# Patient Record
Sex: MALE | Race: WHITE
[De-identification: names, ages, dates, MRNs, and addresses within clinical notes are randomized per-mention and may not be internally consistent; named-entity substitution may affect disease eponyms.]

---

## 2019-01-13 ENCOUNTER — HOSPITAL ENCOUNTER (EMERGENCY)
Dept: HOSPITAL 72 - EMR | Age: 34
Discharge: HOME | End: 2019-01-13
Payer: COMMERCIAL

## 2019-01-13 VITALS — DIASTOLIC BLOOD PRESSURE: 61 MMHG | SYSTOLIC BLOOD PRESSURE: 104 MMHG

## 2019-01-13 VITALS — BODY MASS INDEX: 28.44 KG/M2 | HEIGHT: 69 IN | WEIGHT: 192 LBS

## 2019-01-13 VITALS — SYSTOLIC BLOOD PRESSURE: 107 MMHG | DIASTOLIC BLOOD PRESSURE: 69 MMHG

## 2019-01-13 DIAGNOSIS — G62.9: Primary | ICD-10-CM

## 2019-01-13 DIAGNOSIS — I10: ICD-10-CM

## 2019-01-13 DIAGNOSIS — J45.909: ICD-10-CM

## 2019-01-13 LAB
ADD MANUAL DIFF: NO
ALBUMIN SERPL-MCNC: 3.6 G/DL (ref 3.4–5)
ALBUMIN/GLOB SERPL: 0.9 {RATIO} (ref 1–2.7)
ALP SERPL-CCNC: 87 U/L (ref 46–116)
ALT SERPL-CCNC: 97 U/L (ref 12–78)
ANION GAP SERPL CALC-SCNC: 3 MMOL/L (ref 5–15)
AST SERPL-CCNC: 49 U/L (ref 15–37)
BASOPHILS NFR BLD AUTO: 1.6 % (ref 0–2)
BILIRUB SERPL-MCNC: 0.4 MG/DL (ref 0.2–1)
BUN SERPL-MCNC: 14 MG/DL (ref 7–18)
CALCIUM SERPL-MCNC: 9.2 MG/DL (ref 8.5–10.1)
CHLORIDE SERPL-SCNC: 101 MMOL/L (ref 98–107)
CO2 SERPL-SCNC: 33 MMOL/L (ref 21–32)
CREAT SERPL-MCNC: 1.1 MG/DL (ref 0.55–1.3)
EOSINOPHIL NFR BLD AUTO: 4.7 % (ref 0–3)
ERYTHROCYTE [DISTWIDTH] IN BLOOD BY AUTOMATED COUNT: 11.3 % (ref 11.6–14.8)
GLOBULIN SER-MCNC: 4 G/DL
HCT VFR BLD CALC: 44.7 % (ref 42–52)
HGB BLD-MCNC: 15.2 G/DL (ref 14.2–18)
LYMPHOCYTES NFR BLD AUTO: 35.9 % (ref 20–45)
MCV RBC AUTO: 91 FL (ref 80–99)
MONOCYTES NFR BLD AUTO: 10.3 % (ref 1–10)
NEUTROPHILS NFR BLD AUTO: 47.5 % (ref 45–75)
PLATELET # BLD: 204 K/UL (ref 150–450)
POTASSIUM SERPL-SCNC: 4.4 MMOL/L (ref 3.5–5.1)
RBC # BLD AUTO: 4.91 M/UL (ref 4.7–6.1)
SODIUM SERPL-SCNC: 137 MMOL/L (ref 136–145)
WBC # BLD AUTO: 7.8 K/UL (ref 4.8–10.8)

## 2019-01-13 PROCEDURE — 93005 ELECTROCARDIOGRAM TRACING: CPT

## 2019-01-13 PROCEDURE — 85025 COMPLETE CBC W/AUTO DIFF WBC: CPT

## 2019-01-13 PROCEDURE — 84484 ASSAY OF TROPONIN QUANT: CPT

## 2019-01-13 PROCEDURE — 99284 EMERGENCY DEPT VISIT MOD MDM: CPT

## 2019-01-13 PROCEDURE — 80053 COMPREHEN METABOLIC PANEL: CPT

## 2019-01-13 PROCEDURE — 36415 COLL VENOUS BLD VENIPUNCTURE: CPT

## 2019-01-13 PROCEDURE — 70450 CT HEAD/BRAIN W/O DYE: CPT

## 2019-01-13 NOTE — EMERGENCY ROOM REPORT
History of Present Illness


General


Chief Complaint:  Pain


Source:  Patient





Present Illness


HPI


Patient is a 33-year-old male presented after increased left lower extremity 

numbness.  This is been present for approximately 3 days.  Patient reports 

having prior history of congestive heart failure.  He reports the has heart 

failure secondary to alcohol and drug abuse.  Patient states his last echo he 

had an ejection fraction of approximate 15%.  Patient reports being actively 

drinking at the time of that echocardiogram.  He reports having increased 

numbness to his left lower extremity.  He denies any increase in swelling.  He 

reports taking multiple medications for his heart.


Allergies:  


Coded Allergies:  


     No Known Allergies (Unverified , 1/13/19)





Patient History


Past Medical History:  see triage record


Reviewed Nursing Documentation:  PMH: Agreed; PSxH: Agreed





Nursing Documentation-PMH


Past Medical History:  No Stated History


Hx Hypertension:  Yes


Hx Asthma:  Yes





Review of Systems


All Other Systems:  negative except mentioned in HPI





Physical Exam





Vital Signs








  Date Time  Temp Pulse Resp B/P (MAP) Pulse Ox O2 Delivery O2 Flow Rate FiO2


 


1/13/19 18:20 97.9 74 18 107/69 98 Room Air  








Sp02 EP Interpretation:  reviewed, normal


General Appearance:  normal inspection, well appearing, no apparent distress, 

alert, GCS 15


Head:  atraumatic


ENT:  normal ENT inspection, hearing grossly normal, normal voice


Neck:  normal inspection, full range of motion, supple, no bony tend


Respiratory:  normal inspection, lungs clear, normal breath sounds, no 

respiratory distress, no retraction, no wheezing


Cardiovascular #1:  regular rate, rhythm, no edema


Gastrointestinal:  normal inspection, normal bowel sounds, non tender, soft, no 

guarding, no hernia


Genitourinary:  no CVA tenderness


Musculoskeletal:  normal inspection, back normal, normal range of motion


Neurologic:  normal inspection, alert, oriented x3, responsive, CNs III-XII nml 

as tested, motor strength/tone normal, DTRs symmetric, speech normal, sensory 

deficit, oriented


Psychiatric:  normal inspection, judgement/insight normal, mood/affect normal


Skin:  normal inspection, normal color, no rash





Medical Decision Making


Diagnostic Impression:  


 Primary Impression:  


 Neuropathy


ER Course


 Patient presented for left lower extremity pain.  Differential diagnosis 

included but was not limited to fracture, contusion, vascular insufficiency, 

aortic aneurysm, cellulitis.CT of the head read by radiology showed no acute 

intracranial hemorrhage or definite CVA.  Patient was noted to have some what 

appeared to be artifact per radiology reading.  Laboratory testing was 

unremarkable except for some slight elevation of his liver function test 

consistent with his prior history of hepatitis.  Patient was noted to have 

normal oxygen saturation.  He was noted to have multiple patches of areas of 

pain.  He was advised to follow-up with neurology and advised that he may need 

MRI and to return if he began having worsening symptoms weakness fever or other 

concerns.





Labs








Test


  1/13/19


19:00


 


White Blood Count


  7.8 K/UL


(4.8-10.8)


 


Red Blood Count


  4.91 M/UL


(4.70-6.10)


 


Hemoglobin


  15.2 G/DL


(14.2-18.0)


 


Hematocrit


  44.7 %


(42.0-52.0)


 


Mean Corpuscular Volume 91 FL (80-99) 


 


Mean Corpuscular Hemoglobin


  31.0 PG


(27.0-31.0)


 


Mean Corpuscular Hemoglobin


Concent 34.0 G/DL


(32.0-36.0)


 


Red Cell Distribution Width


  11.3 %


(11.6-14.8)


 


Platelet Count


  204 K/UL


(150-450)


 


Mean Platelet Volume


  7.7 FL


(6.5-10.1)


 


Neutrophils (%) (Auto)


  47.5 %


(45.0-75.0)


 


Lymphocytes (%) (Auto)


  35.9 %


(20.0-45.0)


 


Monocytes (%) (Auto)


  10.3 %


(1.0-10.0)


 


Eosinophils (%) (Auto)


  4.7 %


(0.0-3.0)


 


Basophils (%) (Auto)


  1.6 %


(0.0-2.0)


 


Sodium Level


  137 MMOL/L


(136-145)


 


Potassium Level


  4.4 MMOL/L


(3.5-5.1)


 


Chloride Level


  101 MMOL/L


()


 


Carbon Dioxide Level


  33 MMOL/L


(21-32)


 


Anion Gap


  3 mmol/L


(5-15)


 


Blood Urea Nitrogen


  14 mg/dL


(7-18)


 


Creatinine


  1.1 MG/DL


(0.55-1.30)


 


Estimat Glomerular Filtration


Rate > 60 mL/min


(>60)


 


Glucose Level


  92 MG/DL


()


 


Calcium Level


  9.2 MG/DL


(8.5-10.1)


 


Total Bilirubin


  0.4 MG/DL


(0.2-1.0)


 


Aspartate Amino Transf


(AST/SGOT) 49 U/L (15-37) 


 


 


Alanine Aminotransferase


(ALT/SGPT) 97 U/L (12-78) 


 


 


Alkaline Phosphatase


  87 U/L


()


 


Troponin I


  0.000 ng/mL


(0.000-0.056)


 


Total Protein


  7.6 G/DL


(6.4-8.2)


 


Albumin


  3.6 G/DL


(3.4-5.0)


 


Globulin 4.0 g/dL 


 


Albumin/Globulin Ratio 0.9 (1.0-2.7) 








EKG Diagnostic Results


Rate:  normal


Rhythm:  NSR


ST Segments:  no acute changes





Last Vital Signs








  Date Time  Temp Pulse Resp B/P (MAP) Pulse Ox O2 Delivery O2 Flow Rate FiO2


 


1/13/19 18:20 97.9 74 18 107/69 98 Room Air  








Status:  unchanged


Disposition:  HOME, SELF-CARE











Dar Zaman MD Jan 13, 2019 18:35

## 2019-01-14 NOTE — DIAGNOSTIC IMAGING REPORT
Indication: Headache

 

Technique: Contiguous 5 mm thick transaxial imaging of the head obtained in a Siemens

Sensation 64 slice CT scanner.  Soft tissue and bone windows generated.  Automatic

Exposure Control was utilized.

 

 

Total Dose length Product (DLP):  1382.66 mGycm

 

CT Dose Index Volume (CTDIvol):   70.38 mGy

 

Comparison: none

 

Findings: The size and configuration of the cortical sulci, basal cisterns, and

ventricles are within normal limits for age. There is no mass effect, midline shift,

or edema identified. There is no evidence of acute hemorrhage or abnormal intra-axial

or extra-axial fluid collections. The bones and soft tissues are unremarkable.

 

Impression: No mass effect, edema or acute bleed.

 

[facility] has communicated the preliminary results to the Emergency

Department.  Their findings are largely concordant with this report.

 

 

 

The CT scanner at [facility] is accredited by the American College of

Radiology and the scans are performed using dose optimization techniques as

appropriate to a performed exam including Automatic Exposure control.

## 2019-01-22 ENCOUNTER — HOSPITAL ENCOUNTER (EMERGENCY)
Dept: HOSPITAL 72 - EMR | Age: 34
Discharge: HOME | End: 2019-01-22
Payer: COMMERCIAL

## 2019-01-22 VITALS — SYSTOLIC BLOOD PRESSURE: 110 MMHG | DIASTOLIC BLOOD PRESSURE: 78 MMHG

## 2019-01-22 VITALS — WEIGHT: 200 LBS | BODY MASS INDEX: 29.62 KG/M2 | HEIGHT: 69 IN

## 2019-01-22 DIAGNOSIS — R11.2: Primary | ICD-10-CM

## 2019-01-22 DIAGNOSIS — R19.7: ICD-10-CM

## 2019-01-22 DIAGNOSIS — F11.10: ICD-10-CM

## 2019-01-22 PROCEDURE — 99282 EMERGENCY DEPT VISIT SF MDM: CPT

## 2019-01-22 NOTE — NUR
ED Nurse Note:



Pt came in w/ complaints of abdominal pain, nausea, vomiting, diarrhea since 
this morning s/p naltrexone implant to the left hip. A + O x4. Ambulatory. Skin 
warm to touch. Incision site clean and intact. Open to air. Complaiing of 10/10 
abdominal pain.

## 2019-01-27 ENCOUNTER — HOSPITAL ENCOUNTER (EMERGENCY)
Dept: HOSPITAL 72 - EMR | Age: 34
Discharge: HOME | End: 2019-01-27
Payer: MEDICAID

## 2019-01-27 VITALS — SYSTOLIC BLOOD PRESSURE: 112 MMHG | DIASTOLIC BLOOD PRESSURE: 80 MMHG

## 2019-01-27 VITALS — DIASTOLIC BLOOD PRESSURE: 76 MMHG | SYSTOLIC BLOOD PRESSURE: 110 MMHG

## 2019-01-27 VITALS — BODY MASS INDEX: 26.66 KG/M2 | WEIGHT: 180 LBS | HEIGHT: 69 IN

## 2019-01-27 DIAGNOSIS — Z02.83: Primary | ICD-10-CM

## 2019-01-27 DIAGNOSIS — I50.9: ICD-10-CM

## 2019-01-27 DIAGNOSIS — I11.0: ICD-10-CM

## 2019-01-27 LAB
APPEARANCE UR: CLEAR
APTT PPP: (no result) S
GLUCOSE UR STRIP-MCNC: NEGATIVE MG/DL
KETONES UR QL STRIP: NEGATIVE
LEUKOCYTE ESTERASE UR QL STRIP: (no result)
NITRITE UR QL STRIP: NEGATIVE
PH UR STRIP: 5 [PH] (ref 4.5–8)
PROT UR QL STRIP: NEGATIVE
SP GR UR STRIP: 1.01 (ref 1–1.03)
UROBILINOGEN UR-MCNC: NORMAL MG/DL (ref 0–1)

## 2019-01-27 PROCEDURE — 81001 URINALYSIS AUTO W/SCOPE: CPT

## 2019-01-27 PROCEDURE — 99283 EMERGENCY DEPT VISIT LOW MDM: CPT

## 2019-01-27 PROCEDURE — 80307 DRUG TEST PRSMV CHEM ANLYZR: CPT

## 2019-01-27 NOTE — NUR
ED Nurse Note:



PT SITTING PEACEFULLY IN CHAIR IN NAD. AOX4. DISCHARGE PAPERWORK EXPLAINED TO 
PT. PT VERBALIZES UNDERSTANDING AND ALL QUESTIONS ANSWERED. DISCHARGE PAPERWORK 
GIVEN TO PT AND ID WRISTBAND REMOVED. PT WALKED OUT OF ER WITH STEADY GAIT AND 
ALL BELONGINGS.

## 2019-01-27 NOTE — NUR
ED Nurse Note:



PT WALKED IN TO ER TODAY FROM HOME. AOX4. PT HERE FOR MEDICAL CLEARANCE IN 
ORDER TO GET ADMITTED INTO [facility]IL[facility]ATION [facility] FOR ALCOHOL, HEROIN, AND METH.

## 2019-01-27 NOTE — EMERGENCY ROOM REPORT
History of Present Illness


General


Chief Complaint:  General Complaint


Source:  Patient





Present Illness


HPI


33-year-old male with history of unknown drug use here with his girlfriend for 

urine toxicology screen to prove that he has been clean for going back to [facility] 

rehabilitation center.  She denies any recent use of alcohol, tobacco smoke, 

and any other illicit drugs.  The patient was just here 1 week ago and was 

tested negative for alcohol.  Patient does not want to be tested again for 

alcohol as he claims that last time he took 3 nurses to feel flushed and he 

does not want to be poked again denies chest pain she was moderately hemolyzed, 

no other associated symptoms.  Patient discharged once immediately exactly is 

going through withdrawal symptoms and everything is negative according to today 

even though he is refusing to do blood alcohol level today.


Allergies:  


Coded Allergies:  


     No Known Allergies (Unverified , 1/22/19)





Patient History


Past Medical History:  see triage record


Past Surgical History:  none


Pertinent Family History:  unable to obtain


Social History:  Reports: drug use - unknown


Reviewed Nursing Documentation:  PMH: Agreed; PSxH: Agreed





Nursing Documentation-PMH


Past Medical History:  No History, Except For


Hx Cardiac Problems:  Yes - CHF


Hx Hypertension:  Yes


Hx Asthma:  Yes





Review of Systems


All Other Systems:  negative except mentioned in HPI





Physical Exam





Vital Signs








  Date Time  Temp Pulse Resp B/P (MAP) Pulse Ox O2 Delivery O2 Flow Rate FiO2


 


1/27/19 12:19 97.9 91 18 104/72 99 Room Air  








Sp02 EP Interpretation:  reviewed, normal


General Appearance:  normal inspection, well appearing, alert


Head:  normocephalic


Eyes:  bilateral eye normal inspection, bilateral eye PERRL


ENT:  normal ENT inspection, normal pharynx


Neck:  normal inspection, full range of motion


Respiratory:  normal inspection, no rhonchi


Cardiovascular #1:  normal inspection, regular rate, rhythm


Gastrointestinal:  normal inspection, soft


Musculoskeletal:  normal inspection


Neurologic:  normal inspection, alert, oriented x3


Psychiatric:  normal inspection, judgement/insight normal, memory normal, mood/

affect normal, no suicidal/homicidal ideation


Skin:  normal inspection, well hydrated


Lymphatic:  normal inspection





Medical Decision Making


PA Attestation


all diagnosis and treatment plans are reviewed and discussed with my 

supervising physician Dr. Pedersen


Diagnostic Impression:  


 Primary Impression:  


 Encounter for drug screening


ER Course


33-year-old male with history of unknown drug use here with his girlfriend for 

urine toxicology screen to prove that he has been clean for going back to detox 

rehabilitation center.  She denies any recent use of alcohol, tobacco smoke, 

and any other illicit drugs.  The patient was just here 1 week ago and was 

tested negative for alcohol.  Patient does not want to be tested again for 

alcohol as he claims that last time he took 3 nurses to feel flushed and he 

does not want to be poked again denies chest pain she was moderately hemolyzed, 

no other associated symptoms.  Patient discharged once immediately exactly is 

going through withdrawal symptoms and everything is negative according to today 

even though he is refusing to do blood alcohol level today.





Ddx considered but are not limited to drug screen, negative drug screen, 

positive drug screen 





Vital signs: are WNL, pt. is afebrile





H&PE are most consistent with negative drug screen 





ORDERS:urine toxicology, UA, blood ETOH(refused to do), pt infromed taht we do 

not test for naltraxone levels





ED INTERVENTIONS: None required at this time.








DISCHARGE: At this time pt. is stable for d/c to home. Will provide printed 

patient care instructions, and any necessary prescriptions. Care plan and 

follow up instructions have been discussed with the patient prior to discharge.





can not falsify results and documents and saying no withdrawal as pt oscar tested 

negative for urine tox. cant mention that today's ETOH levels are negative as 

pt refused blood draw. last week lab results and todays results were given to pt





Last Vital Signs








  Date Time  Temp Pulse Resp B/P (MAP) Pulse Ox O2 Delivery O2 Flow Rate FiO2


 


1/27/19 12:29 98.2 86 18 110/76 99 Room Air  








Disposition:  HOME, SELF-CARE


Condition:  Stable





Additional Instructions:  


patient is negative for amphetamines, cocaine, barbiturates, benzodiazepines 

patient declines to do blood alcohol level patient's alcohol level from last 

week is negative however refused to do them today.











Martha Stephen Jan 27, 2019 13:30

## 2022-04-11 NOTE — EMERGENCY ROOM REPORT
History of Present Illness


General


Chief Complaint:  Substance Abuse


Source:  Patient


 (Reed Weiss)





Present Illness


HPI


32 yo male patient presents the ER complaining of vomiting and diarrhea status 

post a few hours following receiving a naltrexone implant earlier today.  

Patient reports that he received a implant for opioid addiction.  Reports has 

been clean for over 1 month.  Reports vomiting and diarrhea, denies blood in 

vomit or stool.  Denies recent travel.  Patient being seen in the ER with 

contact with similar  symptoms who also received an implant earlier today.  

Denies fever, chest pain, shortness of breath.  Vitals stable. Denies recent 

drug use.


 (Reed Weiss)


Allergies:  


Coded Allergies:  


     No Known Allergies (Unverified , 1/22/19)





Patient History


Past Medical History:  see triage record


Reviewed Nursing Documentation:  PMH: Agreed; PSxH: Agreed (Reed Weiss)





Nursing Documentation-PMH


Past Medical History:  No History, Except For


Hx Cardiac Problems:  Yes - CHF


Hx Hypertension:  Yes


Hx Asthma:  Yes


 (Reed Weiss)





Review of Systems


All Other Systems:  negative except mentioned in HPI


 (Reed Weiss)





Physical Exam





Vital Signs








  Date Time  Temp Pulse Resp B/P (MAP) Pulse Ox O2 Delivery O2 Flow Rate FiO2


 


1/22/19 13:29 97.5 88 18 129/84 98 Room Air  








Sp02 EP Interpretation:  reviewed, normal


General Appearance:  well appearing, no apparent distress, alert, GCS 15, non-

toxic


Head:  normocephalic, atraumatic


Eyes:  bilateral eye normal inspection, bilateral eye PERRL


ENT:  hearing grossly normal, normal pharynx, no angioedema, normal voice, 

uvula midline, moist mucus membranes


Neck:  full range of motion


Respiratory:  lungs clear, normal breath sounds, no rhonchi, no respiratory 

distress, no accessory muscle use, no wheezing, speaking full sentences


Cardiovascular #1:  regular rate, rhythm, no edema


Gastrointestinal:  non tender, soft, no mass, non-distended, no guarding, no 

rebound, other - small <1cm on left lateral abdomen , no surrounding erythema 

or edema


Musculoskeletal:  back normal, digits/nails normal, gait/station normal, normal 

range of motion, non-tender


Neurologic:  alert, oriented x3, responsive, motor strength/tone normal, 

sensory intact


Psychiatric:  mood/affect normal


 (Reed Weiss)





Medical Decision Making


PA Attestation


Dr. Huntley is my supervising Physician whom patient management has been 

discussed with.


 (Reed Weiss)


Medicare Attestation


Please note that the patient was also seen by myself


Patient remains hemodynamically stable I do agree with the exam and workup and 

intervention


Patient was observed checking into the emergency room, appropriate no signs of 

any nausea or vomiting, patient is appropriate for outpatient care


 (Jay Huntley DO)


Diagnostic Impression:  


 Primary Impression:  


 Nausea & vomiting


ER Course


Pt. presents to the ED c/o vomiting and diarrhea s/p naltrexone implant earlier 

today.





Ddx considered but are not limited to gastritis, enteritis, withdrawal, opioid 

dependence.





Vital signs: are WNL, pt. is afebrile





ER COURSE:


Provided with Zofran.


Patient not observed to vomit while in the ER.


Physical exam benign, no abdominal TTP.


Patient seen and evaluated by Dr. Huntley, do not believe patient requires 

further treatment or workup at this time.


Will provide Rx for Zofran and recommend patient followup with clinic where 

procedure was performed.


Vtials stable, observed walking around without difficulty.


Informed by nurse patient requesting pain medication following discharge.





DISCHARGE: 





At this time pt is stable for d/c to home.  Patient is resting comfortably, in 

no acute distress, nontoxic appearing, talking without difficulty.


Patient to take medications as instructed


Will provide with patient care instructions and any necessary prescriptions.


Care plan and follow-up instructions provided.  


Patient instructed to follow-up with primary care provider in 3 - 5 days.


Patient questions asked and answered.


Patient reports understanding and agreement to treatment plan.


ER precautions given. Patient instructed to return to ER immediately for any 

new or worsening of symptoms including but not limited to increasing SOB, 

persistent fever, chest pain, intractable vomiting.





- Please note that this Emergency Department Report was dictated using [employer] technology software, occasionally this can lead to 

erroneous entry secondary to interpretation by the dictation equipment.


 (Reed Weiss)





Last Vital Signs








  Date Time  Temp Pulse Resp B/P (MAP) Pulse Ox O2 Delivery O2 Flow Rate FiO2


 


1/22/19 13:29 97.5 88 18 129/84 98 Room Air  








 (Reed Weiss)


Disposition:  HOME, SELF-CARE


Condition:  Stable


Scripts


Ondansetron* (ZOFRAN*) 4 Mg Tablet


4 MG ORAL Q6H PRN for Nausea & Vomiting, #4 TAB


   Prov: Reed Weiss         1/22/19


Patient Instructions:  Diarrhea, Adult, Easy-to-Read, Nausea and Vomiting, Adult

, Easy-to-Read, Opioid Withdrawal





Additional Instructions:  


Follow-up with clinic where procedure was performed immediately.


Followup with primary care provider in 3 -5 days.


Take medications as directed. 


Patient questions asked and answered.


ER precautions given, patient instructed to return to ER immediately for any 

new or worsening of symptoms.











Reed Weiss Jan 22, 2019 13:54


Jay Huntley DO Jan 22, 2019 14:13 Yanni Herrera is a 82 year old female here for  Chief Complaint   Patient presents with   • Blood Disorder     Macrocytic anemia     Denies latex allergy or sensitivity.    Medication verified and med list updated.  PCP and Pharmacy verified.    Social History     Tobacco Use   Smoking Status Former Smoker   • Quit date: 10/20/1996   • Years since quittin.4   Smokeless Tobacco Never Used     Advance Directives Filed: No    ECOG:   ECOG [22 1025]   ECOG Performance Status 1       Vitals:    Visit Vitals  BP (!) 140/66   Pulse (!) 110   Temp 98.3 °F (36.8 °C) (Oral)   Resp 18   Wt 48.7 kg (107 lb 6.4 oz)   LMP  (LMP Unknown)   SpO2 94% Comment: On 2L nasal canula   BMI 19.03 kg/m²       These vital signs are:  Not within defined parameters:  The following abnormal VS were verbally communicated to Provider.    Height: No.  Ht Readings from Last 1 Encounters:   21 5' 3\" (1.6 m)     Weight:Yes, shoes off.  Wt Readings from Last 3 Encounters:   22 48.7 kg (107 lb 6.4 oz)   03/10/22 46.7 kg (103 lb)   22 47 kg (103 lb 9.9 oz)       BMI: Body mass index is 19.03 kg/m².    REVIEW OF SYSTEMS  GENERAL:  Patient denies headache, fevers, chills, night sweats, excessive fatigue, change in appetite, weight loss, dizziness  ALLERGIC/IMMUNOLOGIC: Verified allergies: Yes  EYES:  Patient denies significant visual difficulties, double vision, blurred vision  ENT/MOUTH: Patient denies problems with hearing, sore throat, mouth sores, but complains of: sinus drainage  ENDOCRINE:  Patient denies diabetes, thyroid disease, hormone replacement, hot flashes  HEMATOLOGIC/LYMPHATIC: Patient denies easy bruising, bleeding, tender lymph nodes, swollen lymph nodes  BREASTS: Patient denies abnormal masses of breast, nipple discharge, pain  RESPIRATORY:  Patient denies lung pain with breathing, coughing up blood, but complains of: cough and shortness of breath  CARDIOVASCULAR:  Patient denies anginal chest pain,  palpitations, shortness of breath when lying flat, but complains of: peripheral edema ankles  GASTROINTESTINAL: Patient denies abdominal pain , nausea, vomiting, diarrhea, GI bleeding, constipation, change in bowel habits, heartburn, sensation of feeling full, difficulty swallowing  : Patient denies abnormal genital masses, blood in the urine, frequency, urgency, burning with urination, hesitancy, incontinence, vaginal bleeding, discharge  MUSCULOSKELETAL:  Patient denies joint pain, bone pain, joint swelling, redness, decreased range of motion  SKIN:  Patient denies chronic rashes, inflammation, ulcerations, skin changes, itching  NEUROLOGIC:  Patient denies loss of balance, areas of focal weakness, abnormal gait, sensory problems, numbness, tingling  PSYCHIATRIC: Patient denies insomnia, depression, anxiety    This patient reported abnormal symptoms that needed immediate verbal communication: No